# Patient Record
Sex: FEMALE | Race: BLACK OR AFRICAN AMERICAN | NOT HISPANIC OR LATINO | URBAN - METROPOLITAN AREA
[De-identification: names, ages, dates, MRNs, and addresses within clinical notes are randomized per-mention and may not be internally consistent; named-entity substitution may affect disease eponyms.]

---
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
## 2018-09-19 NOTE — ED PROVIDER NOTE - CONSTITUTIONAL, MLM
yes normal... Well appearing, well nourished, awake, alert, oriented to person, place, time/situation and in no apparent distress.
